# Patient Record
Sex: MALE | Race: ASIAN | Employment: OTHER | URBAN - METROPOLITAN AREA
[De-identification: names, ages, dates, MRNs, and addresses within clinical notes are randomized per-mention and may not be internally consistent; named-entity substitution may affect disease eponyms.]

---

## 2019-06-11 ENCOUNTER — APPOINTMENT (OUTPATIENT)
Dept: GENERAL RADIOLOGY | Facility: HOSPITAL | Age: 84
End: 2019-06-11
Attending: EMERGENCY MEDICINE
Payer: MEDICARE

## 2019-06-11 ENCOUNTER — HOSPITAL ENCOUNTER (EMERGENCY)
Facility: HOSPITAL | Age: 84
Discharge: HOME | End: 2019-06-11
Attending: EMERGENCY MEDICINE
Payer: MEDICARE

## 2019-06-11 VITALS
BODY MASS INDEX: 20.49 KG/M2 | SYSTOLIC BLOOD PRESSURE: 145 MMHG | HEIGHT: 65 IN | DIASTOLIC BLOOD PRESSURE: 78 MMHG | OXYGEN SATURATION: 100 % | TEMPERATURE: 98 F | RESPIRATION RATE: 20 BRPM | HEART RATE: 71 BPM | WEIGHT: 123 LBS

## 2019-06-11 DIAGNOSIS — R07.89 CHEST PAIN, ATYPICAL: Primary | ICD-10-CM

## 2019-06-11 PROBLEM — E87.1 HYPONATREMIA: Status: ACTIVE | Noted: 2019-06-11

## 2019-06-11 PROBLEM — R73.9 HYPERGLYCEMIA: Status: ACTIVE | Noted: 2019-06-11

## 2019-06-11 PROCEDURE — 99285 EMERGENCY DEPT VISIT HI MDM: CPT

## 2019-06-11 PROCEDURE — 84484 ASSAY OF TROPONIN QUANT: CPT

## 2019-06-11 PROCEDURE — 85378 FIBRIN DEGRADE SEMIQUANT: CPT | Performed by: EMERGENCY MEDICINE

## 2019-06-11 PROCEDURE — 85025 COMPLETE CBC W/AUTO DIFF WBC: CPT

## 2019-06-11 PROCEDURE — 99284 EMERGENCY DEPT VISIT MOD MDM: CPT

## 2019-06-11 PROCEDURE — 93005 ELECTROCARDIOGRAM TRACING: CPT

## 2019-06-11 PROCEDURE — 85025 COMPLETE CBC W/AUTO DIFF WBC: CPT | Performed by: EMERGENCY MEDICINE

## 2019-06-11 PROCEDURE — 93010 ELECTROCARDIOGRAM REPORT: CPT

## 2019-06-11 PROCEDURE — 80053 COMPREHEN METABOLIC PANEL: CPT

## 2019-06-11 PROCEDURE — 80053 COMPREHEN METABOLIC PANEL: CPT | Performed by: EMERGENCY MEDICINE

## 2019-06-11 PROCEDURE — 71045 X-RAY EXAM CHEST 1 VIEW: CPT | Performed by: EMERGENCY MEDICINE

## 2019-06-11 PROCEDURE — 36415 COLL VENOUS BLD VENIPUNCTURE: CPT

## 2019-06-11 PROCEDURE — 84484 ASSAY OF TROPONIN QUANT: CPT | Performed by: EMERGENCY MEDICINE

## 2019-06-11 RX ORDER — FLUTICASONE PROPIONATE 50 MCG
1 SPRAY, SUSPENSION (ML) NASAL DAILY
COMMUNITY
End: 2021-05-25 | Stop reason: ALTCHOICE

## 2019-06-11 NOTE — ED INITIAL ASSESSMENT (HPI)
Pt c/o intermittent chest pain x 2 weeks, c/o productive cough and wheezing x 6 weeks, went to the IC today for chest pain.   Sent to ER for stress test.

## 2019-06-11 NOTE — ED PROVIDER NOTES
Patient Seen in: [facility] Emergency Department    History   Patient presents with:  Dyspnea SHYAM SOB (respiratory)    Stated Complaint: seen at 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          MALICK Prescribed:  Current Discharge Medication List

## 2021-05-25 PROBLEM — R07.89 CHEST PAIN, ATYPICAL: Status: RESOLVED | Noted: 2019-06-11 | Resolved: 2021-05-25

## 2021-05-25 PROBLEM — I70.0 AORTIC ATHEROSCLEROSIS (HCC): Status: ACTIVE | Noted: 2021-05-25

## 2021-05-25 PROBLEM — E87.1 HYPONATREMIA: Status: RESOLVED | Noted: 2019-06-11 | Resolved: 2021-05-25

## 2021-05-25 PROBLEM — R73.9 HYPERGLYCEMIA: Status: RESOLVED | Noted: 2019-06-11 | Resolved: 2021-05-25

## 2021-05-25 PROBLEM — C61 PROSTATE CANCER (HCC): Status: ACTIVE | Noted: 2021-05-25

## 2021-08-18 PROBLEM — M15.9 PRIMARY OSTEOARTHRITIS INVOLVING MULTIPLE JOINTS: Status: ACTIVE | Noted: 2021-08-18

## 2021-08-18 PROBLEM — N18.31 CHRONIC RENAL IMPAIRMENT, STAGE 3A (HCC): Status: ACTIVE | Noted: 2021-08-18

## 2021-09-12 ENCOUNTER — OFFICE VISIT (OUTPATIENT)
Dept: FAMILY MEDICINE CLINIC | Facility: CLINIC | Age: 86
End: 2021-09-12
Payer: COMMERCIAL

## 2021-09-12 VITALS
HEART RATE: 64 BPM | HEIGHT: 65 IN | SYSTOLIC BLOOD PRESSURE: 126 MMHG | OXYGEN SATURATION: 99 % | RESPIRATION RATE: 16 BRPM | DIASTOLIC BLOOD PRESSURE: 64 MMHG | BODY MASS INDEX: 20.66 KG/M2 | WEIGHT: 124 LBS | TEMPERATURE: 97 F

## 2021-09-12 DIAGNOSIS — L03.011 PARONYCHIA OF RIGHT MIDDLE FINGER: Primary | ICD-10-CM

## 2021-09-12 PROCEDURE — 3008F BODY MASS INDEX DOCD: CPT | Performed by: FAMILY MEDICINE

## 2021-09-12 PROCEDURE — 99202 OFFICE O/P NEW SF 15 MIN: CPT | Performed by: FAMILY MEDICINE

## 2021-09-12 PROCEDURE — 3074F SYST BP LT 130 MM HG: CPT | Performed by: FAMILY MEDICINE

## 2021-09-12 PROCEDURE — 3078F DIAST BP <80 MM HG: CPT | Performed by: FAMILY MEDICINE

## 2021-09-12 RX ORDER — AMOXICILLIN 875 MG/1
875 TABLET, COATED ORAL 2 TIMES DAILY
Qty: 14 TABLET | Refills: 0 | Status: SHIPPED | OUTPATIENT
Start: 2021-09-12 | End: 2021-09-19

## 2021-09-12 NOTE — PROGRESS NOTES
CHIEF COMPLAINT:   Patient presents with:  Finger Pain: right index, swollen x2wks      HPI:     Marly Daniel is a 80year old male who presents with concerns of right 3rd finger injury few weeks ago.  Notes a ball struck the dorsal aspect of his distal 3 developed, well nourished,in no apparent distress  SKIN: right 3rd digit with moderate erythema, swelling and tenderness to palpation along the base of the nail border.  The lateral aspect of the base of the nail and cuticle shows some defect and an apparen issues and agrees to the plan. The patient is asked to return in 2-3 days if sx's persist or worsen.

## 2021-09-12 NOTE — PATIENT INSTRUCTIONS
Take antibiotics with food and plenty of water. Eat yogurt or take probiotic daily. (Sweta Alvarado is a good example of an OTC probiotic)  Make sure to finish the entire antibiotic treatment.   Increase fluids and rest.   You may soak your finger in warm water wi

## (undated) NOTE — ED AVS SNAPSHOT
Ada De La Rosa   MRN: [id number]    Department:  [facility] Emergency Department   Date of Visit:  6/11/2019           Disclosure     Insurance plans vary and the physician(s) referred by the ER may not be covered by your plan.  Please contact your tell this physician (or your personal doctor if your instructions are to return to your personal doctor) about any new or lasting problems. The primary care or specialist physician will see patients referred from the [facility] Emergency Department.  Arthor Bernheim